# Patient Record
(demographics unavailable — no encounter records)

---

## 2025-01-09 NOTE — DISCUSSION/SUMMARY
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 Jackson vascular surgery), hyperlipidemia, status post COVID-19 infection in 2020, mitral valve regurgitation, who comes in for follow-up cardiac evaluation.  She denies chest pain, shortness of breath, dizziness or syncope. Electrocardiogram done January 9, 2025 demonstrated sinus bradycardia rate of 53 bpm otherwise remarkable for left ventricular Gosia, left anterior hemiblock, left axis deviation, and nonspecific ST wave changes. Lipid panel done August 22, 2024 demonstrated cholesterol 155, HDL 58, triglycerides 78, LDL calculated 82 mg/dL, LDL direct 80 mg/dL, non-HDL cholesterol 97 mg/dL with hemoglobin A1c of 6.2. Echo Doppler examination done February 23rd 2024 demonstrated satisfactory left ventricular function with estimated ejection fraction 64%, mild left ventricular hypertrophy, minimal mitral valve regurgitation, mild tricuspid valve regurgitation, trace aortic valve regurgitation and minimal pulmonic valve regurgitation. The patient blood pressure stable on carvedilol 12.5 mg twice a day, clonidine 0.1 mg, olmesartan 40/25 mg each morning, potassium chloride 10 mEq daily, Crestor 20 mg daily. Patient will have new blood work done today for lipid profile   The patient had normal exercise stress test August 22, 2024.  Her blood pressure response was adequate. She is followed by vascular surgery for her abdominal aortic pathology.  She was just seen by vascular surgery in June 2024. Lipid panel done February 23, 2024 demonstrated cholesterol 134, HDL 55, triglycerides 87, LDL calculated 63, non-HDL cholesterol 79, LDL direct 62 mg/dL, potassium 3.9 hemoglobin A1c 6.7. The patient is instructed follow-up with her primary care physician regarding her hyperglycemia.  Echo Doppler examination done February 23, 2024 demonstrated normal ejection fraction 64% with mild left ventricular hypertrophy, minimal mitral valve regurgitation, mild tricuspid valve regurgitation, trace aortic valve regurgitation and minimal pulmonic valve regurgitation. She will have new blood work done today for lipid panel, hemoglobin A1c, electrolytes and TSH. The patient reports that her blood pressure has remained elevated reporting blood pressure 144/95 range. Electrocardiogram done October 18, 2023 demonstrates sinus bradycardia 56 bpm is otherwise remarkable for left axis deviation, poor R wave progression and nonspecific ST wave changes. Patient reports that she was placed on metformin once per day and has developed dizziness.  I have asked her to stop the metformin and follow-up with her primary care physician regarding alternative therapy or alternative dose schedule. I would recommend the patient have an ambulatory blood pressure monitor and be done to rule out hypertension.  I would also recommend patient schedule a home sleep study to rule out sleep apnea as a contributor to her  hypertension. Patient's blood pressure today is under adequate control. She is currently on Benicar hydrochlorothiazide 40/25 mg daily, Norvasc 10 mg in the evening, carvedilol 12.5 mg twice per day, clonidine 0.1 mg twice per day potassium chloride 10 mEq daily, and Crestor 20 mg/day. Electrocardiogram done April 10, 2023 demonstrated sinus bradycardia rate of 52 bpm is otherwise remarkable for nonspecific ST-T wave changes. The patient will continue on her current diet and walking program. Lipid panel done November 7, 2022 demonstrated direct LDL of 79 mg/dL, cholesterol 150 mg/dL, HDL 57 mg/dL, triglycerides 63 mg/dL and non-HDL cholesterol 93 mg/dL with hemoglobin A1c of 6.6. She will follow-up with her primary care physician regarding improvement in her hemoglobin A1c. Echo Doppler examination done November 7, 2022 demonstrated normal left ventricular ejection fraction of 64%, with trace aortic valve regurgitation minimal to mild mitral valve regurgitation, mild tricuspid valve regurgitation and mild concentric left ventricular hypertrophy. The patient had normal exercise stress test November 7, 2022. She will follow-up with her primary care physician for overall medical care.  She understands he must limit her salt intake, maintain good aerobic activity. The patient had a normal exercise stress test May 24, 2021. The patient had a normal nuclear stress test July 2016. The patient understands that aerobic exercises must be increased to 40 minutes 4 times per week. A detailed discussion of lifestyle modification was done today. The patient has a good understanding of the diagnosis, and treatment plan. Lifestyle modification was also outlined.

## 2025-01-09 NOTE — REASON FOR VISIT
[CV Risk Factors and Non-Cardiac Disease] : CV risk factors and non-cardiac disease [Other: ____] : [unfilled] [FreeTextEntry3] : Dr. Mauro [Follow-Up - Clinic] : a clinic follow-up of [Hyperlipidemia] : hyperlipidemia [Hypertension] : hypertension [Peripheral Vascular Disease] : peripheral vascular disease [FreeTextEntry1] : mumur, aortic regurgitation, abdominal aortic aneurysm, left ventricular hypertrophy, SOB

## 2025-01-09 NOTE — PHYSICAL EXAM
[Well Developed] : well developed [Well Nourished] : well nourished [No Acute Distress] : no acute distress [Normal Venous Pressure] : normal venous pressure [No Carotid Bruit] : no carotid bruit [Normal S1, S2] : normal S1, S2 [No Rub] : no rub [Clear Lung Fields] : clear lung fields [Good Air Entry] : good air entry [No Respiratory Distress] : no respiratory distress  [Soft] : abdomen soft [Non Tender] : non-tender [No Masses/organomegaly] : no masses/organomegaly [Normal Bowel Sounds] : normal bowel sounds [Normal Gait] : normal gait [No Edema] : no edema [No Cyanosis] : no cyanosis [No Clubbing] : no clubbing [No Varicosities] : no varicosities [No Rash] : no rash [No Skin Lesions] : no skin lesions [Moves all extremities] : moves all extremities [No Focal Deficits] : no focal deficits [Normal Speech] : normal speech [Alert and Oriented] : alert and oriented [Normal memory] : normal memory [General Appearance - Well Developed] : well developed [Normal Appearance] : normal appearance [Well Groomed] : well groomed [General Appearance - Well Nourished] : well nourished [No Deformities] : no deformities [General Appearance - In No Acute Distress] : no acute distress [Normal Conjunctiva] : the conjunctiva exhibited no abnormalities [Normal Oral Mucosa] : normal oral mucosa [Normal Oropharynx] : normal oropharynx [Normal Jugular Venous A Waves Present] : normal jugular venous A waves present [Normal Jugular Venous V Waves Present] : normal jugular venous V waves present [No Jugular Venous Ramirez A Waves] : no jugular venous ramirez A waves [FreeTextEntry1] : carotid bruit vs transmitted murmur  [Respiration, Rhythm And Depth] : normal respiratory rhythm and effort [Exaggerated Use Of Accessory Muscles For Inspiration] : no accessory muscle use [Auscultation Breath Sounds / Voice Sounds] : lungs were clear to auscultation bilaterally [Bowel Sounds] : normal bowel sounds [Abdomen Soft] : soft [Abdomen Tenderness] : non-tender [Abnormal Walk] : normal gait [Nail Clubbing] : no clubbing of the fingernails [Cyanosis, Localized] : no localized cyanosis [Skin Color & Pigmentation] : normal skin color and pigmentation [Skin Turgor] : normal skin turgor [] : no rash [Oriented To Time, Place, And Person] : oriented to person, place, and time [Affect] : the affect was normal [Mood] : the mood was normal [No Anxiety] : not feeling anxious [5th Left ICS - MCL] : palpated at the 5th LICS in the midclavicular line [Normal] : normal [No Precordial Heave] : no precordial heave was noted [Apical Thrill] : no thrill palpable at the apex [Normal Rate] : normal [Rhythm Regular] : regular [Normal S1] : normal S1 [Normal S2] : normal S2 [No Gallop] : no gallop heard [S3] : no S3 [S4] : no S4 [Click] : no click [Pericardial Rub] : no pericardial rub [I] : a grade 1 [II] : a grade 2 [Right Carotid Bruit] : no bruit heard over the right carotid [Left Carotid Bruit] : no bruit heard over the left carotid [Right Femoral Bruit] : no bruit heard over the right femoral artery [Left Femoral Bruit] : no bruit heard over the left femoral artery [2+] : left 2+ [No Abnormalities] : the abdominal aorta was not enlarged and no bruit was heard [No Pitting Edema] : no pitting edema present

## 2025-01-09 NOTE — ASSESSMENT
[FreeTextEntry1] : Prior note nurse practitioner Babak February 23, 2024::  This is a 67-year-old female here today for follow-up cardiac evaluation  She has a past medical history significant for hypertension, hyperlipidemia, mitral valve regurgitation and aortic aneurysm.  Cardiac risk factors include HTN, HLD.  HPI: She is feeling generally well today and denies chest pain, dizziness, heart palpitations, recent episodes of syncope or falls, SOB, or dyspnea at this time.  Current Medications: ASA 81mg PO DAILY, Carvedilol 12.5mg PO DAILY, Clonidine 0.1mg, Amlodipine 10mg PO DAILY, Olmesartan HCTZ 40/25mg PO DAILY Rosuvastatin 20mg PO DAILY and on K tabs 10 MEQ daily.   These medications are managed by her PCP Dr. Mauro as per the patient.    BLOOD PRESSURE: -BP is well controlled in today's visit.   -I have discussed the importance of maintaining good BP control and reviewed the newest guidelines with the patient while re-enforcing dietary sodium restrictions to no more than 2-3 g daily, DASH diet, life style modifications as well as the goal of maintaining ideal body weight with the patient today. I have advised the patient to avoid the use of over-the-counter medications/ supplements especially NSAIDS.   BLOOD WORK: -New blood work was done 02/23/2024 to evaluate lipid profile, CBC, BMP, hepatic function, A1C and TSH. -Lipid panel done April 2023 demonstrated triglyceride 76, cholesterol 149, HDL 60, LDL 73, non-HDL 88, LDL direct 76. -Lipid panel done November 2022 demonstrated triglyceride 63, cholesterol 150, HDL 57, LDL 79, Non-HDL 93, LDL direct 81   TESTING/REPORTS: -Normal 24-Hour BP monitor done October 2023.   -Electrocardiogram done October 18, 2023 demonstrates sinus bradycardia 56 bpm is otherwise remarkable for left axis deviation, poor R wave progression and nonspecific ST wave changes.  -EKG done May 27, 2022 which demonstrated regular sinus rhythm with nonspecific ST-T wave changes, BPM of 55.  -EKG done Feb 08, 2021 which demonstrated regular sinus rhythm with nonspecific ST-T wave changes, BPM of 66.  -The patient had a normal exercise stress test May 24, 2021.  -She had a normal cardiac catheterization done in 2008 which did not show evidence for CAD.    PLAN: -She will continue with her usual medications and will contact the office if she is having any complaints between now and their next follow up appointment. -She will schedule an exercise stress test to evaluate for significant coronary artery disease. -She will follow up with her PCP Dr. Mauro.   I have discussed the plan of care with PRIMEJENNIFER ARTURO and she will follow up in 3-4 months. They are compliant with all of their medications.     The patient understands that aerobic exercises must be increased to 40 minutes 4 times/week and a detailed discussion of lifestyle modification was done today.   The patient has a good understanding of the diagnosis, treatment plan and lifestyle modification.   They will contact me at the office for any questions with their care or any changes in their health status.   The patient was discussed with supervision physician Dr. Lazaro Ewing at the time of the visit and the plan of care will be carried out as noted above.     MOSHE Hilliard NP

## 2025-06-24 NOTE — DATA REVIEWED
[FreeTextEntry1] : 02/12/2013 AI Duplex no AAA aortic sig for chronic dissection at the juxtarenal level  6/24/2016 AID  sig for dissection  from renal level to aortic bifurcation in comparison to  previous Ct no sig change, patent collin iliac arterial systems  AA at renal level 2.8cm   8/18/2017 AID  patent AA dissection from the renal level to aortic bif stable AA at renal level  2.4cm AA at infrarenal level 1.9cm  2/19/2019 AID  patent AA dissection from the renal level to aortic bif stable AA at renal level  1.8 cm AA at infrarenal level 1.8cm, chronic aortic dissection at the renal level unchanged    3/3/2020 AID  patent AA dissection from the renal level to aortic bif stable AA at renal level  1.7 cm AA                                  at infrarenal level 1.6cm, chronic aortic dissection at the renal level unchanged   3/9/2021 AID  patent AA dissection from the renal level to aortic bif                                     AA at renal level  1.7 cm AA                                  at infrarenal level 2.0 cm,                                   chronic aortic dissection at the renal level unchanged    5/31/2022  AID  patent AA dissection from the renal level to aortic bif                                     AA at renal level  2.3 cm AA                                  at infrarenal level 2.2 cm,                                   chronic aortic dissection at the renal level unchanged   6/6/2023 AID  patent AA dissection from the renal level to aortic bif                                     AA at renal level  2.1 cm AA                                  at infrarenal level 1.9  cm,                                   chronic aortic dissection at the renal level unchanged    6/4/2024  AID  patent AA dissection from the renal level to aortic bif                                     AA at renal level  1.9 cm AA                                  at infrarenal level 1.8   cm,                                   chronic aortic dissection at the renal level unchanged   6/24/2025   AID  patent AA dissection from the renal level to aortic bif                                     AA at renal level  2.1 cm AA                                  at infrarenal level 1.7   cm,                                   chronic aortic dissection at the renal level unchanged

## 2025-06-24 NOTE — ASSESSMENT
[Arterial/Venous Disease] : arterial/venous disease [Medication Management] : medication management [FreeTextEntry1] : Impression abdominal aortic dissection asymptomatic w  stable aortic ectasia   [facility] management f/u w cardiologist for HTN management continue baby asa daily  f/u w Dr Ewing for  carotid surveillance  Ov w AID f/u dissection s/o ectasia  june 2026    Letter to Dr DOLLY Ewing MD

## 2025-06-24 NOTE — PHYSICAL EXAM
[1+] : left 1+ [No HSM] : no hepatosplenomegaly [Alert] : alert [Oriented to Person] : oriented to person [Oriented to Place] : oriented to place [Oriented to Time] : oriented to time [Calm] : calm [JVD] : no jugular venous distention  [2+] : left 2+ [Right Carotid Bruit] : right carotid bruit heard [Left Carotid Bruit] : left carotid bruit heard [Ankle Swelling (On Exam)] : not present [Varicose Veins Of Lower Extremities] : not present [] : not present [Abdomen Masses] : No abdominal masses [de-identified] : nad [de-identified] : wnl [de-identified] : no resp distress [FreeTextEntry1] : abd soft nt nd  [de-identified] : wnl [de-identified] : Juan Cranial nerves 2-12 juan grossly intact [de-identified] : cooperative

## 2025-06-24 NOTE — HISTORY OF PRESENT ILLNESS
[FreeTextEntry1] : f/u for abdominal aortic dissection \par  pt has no abd or le  c/o\par  pt is on baby asa daily  [de-identified] : pt is on baby asa daily\par  pt states no abd c/o \par  pt states that she is receiving carotid surveillance from Dr DOLLY Ewing MD

## 2025-07-11 NOTE — PHYSICAL EXAM
[Well Developed] : well developed [Well Nourished] : well nourished [No Acute Distress] : no acute distress [Normal Venous Pressure] : normal venous pressure [No Carotid Bruit] : no carotid bruit [Normal S1, S2] : normal S1, S2 [No Rub] : no rub [Clear Lung Fields] : clear lung fields [Good Air Entry] : good air entry [No Respiratory Distress] : no respiratory distress  [Soft] : abdomen soft [Non Tender] : non-tender [No Masses/organomegaly] : no masses/organomegaly [Normal Bowel Sounds] : normal bowel sounds [Normal Gait] : normal gait [No Edema] : no edema [No Cyanosis] : no cyanosis [No Clubbing] : no clubbing [No Varicosities] : no varicosities [No Rash] : no rash [No Skin Lesions] : no skin lesions [Moves all extremities] : moves all extremities [No Focal Deficits] : no focal deficits [Normal Speech] : normal speech [Alert and Oriented] : alert and oriented [Normal memory] : normal memory [General Appearance - Well Developed] : well developed [Normal Appearance] : normal appearance [Well Groomed] : well groomed [General Appearance - Well Nourished] : well nourished [No Deformities] : no deformities [General Appearance - In No Acute Distress] : no acute distress [Normal Conjunctiva] : the conjunctiva exhibited no abnormalities [Normal Oral Mucosa] : normal oral mucosa [Normal Oropharynx] : normal oropharynx [Normal Jugular Venous A Waves Present] : normal jugular venous A waves present [Normal Jugular Venous V Waves Present] : normal jugular venous V waves present [No Jugular Venous Ramirez A Waves] : no jugular venous ramirez A waves [Respiration, Rhythm And Depth] : normal respiratory rhythm and effort [Exaggerated Use Of Accessory Muscles For Inspiration] : no accessory muscle use [Auscultation Breath Sounds / Voice Sounds] : lungs were clear to auscultation bilaterally [Bowel Sounds] : normal bowel sounds [Abdomen Soft] : soft [Abdomen Tenderness] : non-tender [Abnormal Walk] : normal gait [Nail Clubbing] : no clubbing of the fingernails [Cyanosis, Localized] : no localized cyanosis [Skin Color & Pigmentation] : normal skin color and pigmentation [Skin Turgor] : normal skin turgor [] : no rash [Oriented To Time, Place, And Person] : oriented to person, place, and time [Affect] : the affect was normal [Mood] : the mood was normal [No Anxiety] : not feeling anxious [5th Left ICS - MCL] : palpated at the 5th LICS in the midclavicular line [Normal] : normal [No Precordial Heave] : no precordial heave was noted [Normal Rate] : normal [Rhythm Regular] : regular [Normal S1] : normal S1 [Normal S2] : normal S2 [No Gallop] : no gallop heard [I] : a grade 1 [II] : a grade 2 [2+] : left 2+ [No Abnormalities] : the abdominal aorta was not enlarged and no bruit was heard [No Pitting Edema] : no pitting edema present [FreeTextEntry1] : carotid bruit vs transmitted murmur  [Apical Thrill] : no thrill palpable at the apex [S3] : no S3 [S4] : no S4 [Click] : no click [Distant] : the heart sounds were ~L not distant [Pericardial Rub] : no pericardial rub [Right Carotid Bruit] : no bruit heard over the right carotid [Left Carotid Bruit] : no bruit heard over the left carotid [Right Femoral Bruit] : no bruit heard over the right femoral artery [Left Femoral Bruit] : no bruit heard over the left femoral artery

## 2025-07-11 NOTE — REASON FOR VISIT
HEARING AID CHECK    Audiology   Hearing Aid     :  Connotateak Model/style/color: Eldao M30-R / LIZABETH / Silver Rios   Date of purchase/dispense date: 4/14/2021    REPAIR WARRANTY EXPIRATION DATE: 06/21/2024    Loss and Damage expiration date: 06/21/2024   [employer] expiration date: 06/21/2022   Serial # right: [id number]  //  Serial # left: [id number]   Payor: Commercial: [employer]     Receivers replaced 6/8/2022    Battery size: Internal Lithium Ion   Dome: Right: small power  //  Left: small power   Supplies/wax filters: [employer]   /retention loop: 1 M R/L / yes     Accessory: Phonak    ---Accessory serial # [id number] //     ---Accessory warranty expiration date: 06/21/2024    used: "[employer]     Updated by FIDE Charles on 6/8/2022    SUBJECTIVE:  Reason for visit: Michele is here to have his hearing aids cleaned and checked.  The retention tail fell of while he was cleaning his hearing aids.  His hearing aids are also not pairing to the kristina after an iOS update to the phone.     OBJECTIVE:  Services provided:   Cleaned Hearing Aid Parts: casing, microphone and   Replaced Hearing Aid Parts: wax filter, dome, retention line  Listening Check: both impressions: good    ASSESSMENT:  The filter on the right hearing aid had wax.  Aid was cleaned and checked and is in good working order.  The left filter also had wax.  It was cleaned and checked and is in good working order. Aids were connected to [employer] but no firmware update was needed at this time.     Aids were returned to Michele and they sounded better. Unpaired and repaired hearing aids to [employer] kristina successfully.    FOLLOW-UP:  As needed for clean and check.  $50.00 [facility]   [CV Risk Factors and Non-Cardiac Disease] : CV risk factors and non-cardiac disease [Other: ____] : [unfilled] [Follow-Up - Clinic] : a clinic follow-up of [Hyperlipidemia] : hyperlipidemia [Hypertension] : hypertension [Peripheral Vascular Disease] : peripheral vascular disease [FreeTextEntry3] : Dr. Mauro [FreeTextEntry1] : mumur, aortic regurgitation, abdominal aortic aneurysm, left ventricular hypertrophy, SOB

## 2025-07-11 NOTE — DISCUSSION/SUMMARY
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 Jackson vascular surgery), hyperlipidemia, status post COVID-19 infection in 2020, mitral valve regurgitation, who comes in for follow-up cardiac evaluation.  She denies chest pain, shortness of breath, dizziness or syncope. The patient had normal exercise stress test done July 11, 2025. She is watching her diet and salt intake. She will have new blood work done today for electrolytes, lipid panel, hemoglobin A1c. Lipid panel done January 9, 2025 demonstrated cholesterol 148, HDL 57, triglycerides 74, LDL calculated 76, non-HDL cholesterol 91, LDL direct 71 mg/dL, hemoglobin A1c 6.4. Echo Doppler examination done July 3, 2025 demonstrated minimal mitral valve regurgitation, mild tricuspid valve regurgitation, minimal to mild pulmonic valve regurgitation, sigmoid basal interventricular septum, mild left ventricular hypertrophy and normal ejection fraction of 64%. Electrocardiogram done January 9, 2025 demonstrated sinus bradycardia rate of 53 bpm otherwise remarkable for left ventricular hypertrophy left anterior hemiblock, left axis deviation, and nonspecific ST wave changes. Lipid panel done August 22, 2024 demonstrated cholesterol 155, HDL 58, triglycerides 78, LDL calculated 82 mg/dL, LDL direct 80 mg/dL, non-HDL cholesterol 97 mg/dL with hemoglobin A1c of 6.2. Echo Doppler examination done February 23rd 2024 demonstrated satisfactory left ventricular function with estimated ejection fraction 64%, mild left ventricular hypertrophy, minimal mitral valve regurgitation, mild tricuspid valve regurgitation, trace aortic valve regurgitation and minimal pulmonic valve regurgitation. The patient blood pressure stable on carvedilol 12.5 mg twice a day, clonidine 0.1 mg, olmesartan 40/25 mg each morning, potassium chloride 10 mEq daily, Crestor 20 mg daily. Patient will have new blood work done today for lipid profile   The patient had normal exercise stress test August 22, 2024.  Her blood pressure response was adequate. She is followed by vascular surgery for her abdominal aortic pathology.  She was just seen by vascular surgery in June 2024. Lipid panel done February 23, 2024 demonstrated cholesterol 134, HDL 55, triglycerides 87, LDL calculated 63, non-HDL cholesterol 79, LDL direct 62 mg/dL, potassium 3.9 hemoglobin A1c 6.7. The patient is instructed follow-up with her primary care physician regarding her hyperglycemia.  Echo Doppler examination done February 23, 2024 demonstrated normal ejection fraction 64% with mild left ventricular hypertrophy, minimal mitral valve regurgitation, mild tricuspid valve regurgitation, trace aortic valve regurgitation and minimal pulmonic valve regurgitation. She will have new blood work done today for lipid panel, hemoglobin A1c, electrolytes and TSH. The patient reports that her blood pressure has remained elevated reporting blood pressure 144/95 range. Electrocardiogram done October 18, 2023 demonstrates sinus bradycardia 56 bpm is otherwise remarkable for left axis deviation, poor R wave progression and nonspecific ST wave changes. Patient reports that she was placed on metformin once per day and has developed dizziness.  I have asked her to stop the metformin and follow-up with her primary care physician regarding alternative therapy or alternative dose schedule. I would recommend the patient have an ambulatory blood pressure monitor and be done to rule out hypertension.  I would also recommend patient schedule a home sleep study to rule out sleep apnea as a contributor to her  hypertension. Patient's blood pressure today is under adequate control. She is currently on Benicar hydrochlorothiazide 40/25 mg daily, Norvasc 10 mg in the evening, carvedilol 12.5 mg twice per day, clonidine 0.1 mg twice per day potassium chloride 10 mEq daily, and Crestor 20 mg/day. Electrocardiogram done April 10, 2023 demonstrated sinus bradycardia rate of 52 bpm is otherwise remarkable for nonspecific ST-T wave changes. The patient will continue on her current diet and walking program. Lipid panel done November 7, 2022 demonstrated direct LDL of 79 mg/dL, cholesterol 150 mg/dL, HDL 57 mg/dL, triglycerides 63 mg/dL and non-HDL cholesterol 93 mg/dL with hemoglobin A1c of 6.6. She will follow-up with her primary care physician regarding improvement in her hemoglobin A1c. Echo Doppler examination done November 7, 2022 demonstrated normal left ventricular ejection fraction of 64%, with trace aortic valve regurgitation minimal to mild mitral valve regurgitation, mild tricuspid valve regurgitation and mild concentric left ventricular hypertrophy. The patient had normal exercise stress test November 7, 2022. She will follow-up with her primary care physician for overall medical care.  She understands he must limit her salt intake, maintain good aerobic activity. The patient had a normal exercise stress test May 24, 2021. The patient had a normal nuclear stress test July 2016. The patient understands that aerobic exercises must be increased to 40 minutes 4 times per week. A detailed discussion of lifestyle modification was done today. The patient has a good understanding of the diagnosis, and treatment plan. Lifestyle modification was also outlined.